# Patient Record
Sex: FEMALE
[De-identification: names, ages, dates, MRNs, and addresses within clinical notes are randomized per-mention and may not be internally consistent; named-entity substitution may affect disease eponyms.]

---

## 2017-10-10 ENCOUNTER — RESULT REVIEW (OUTPATIENT)
Age: 32
End: 2017-10-10

## 2018-03-16 ENCOUNTER — TRANSCRIPTION ENCOUNTER (OUTPATIENT)
Age: 33
End: 2018-03-16

## 2018-03-27 ENCOUNTER — APPOINTMENT (OUTPATIENT)
Dept: OTOLARYNGOLOGY | Facility: CLINIC | Age: 33
End: 2018-03-27

## 2018-08-01 ENCOUNTER — TRANSCRIPTION ENCOUNTER (OUTPATIENT)
Age: 33
End: 2018-08-01

## 2019-05-30 ENCOUNTER — RESULT REVIEW (OUTPATIENT)
Age: 34
End: 2019-05-30

## 2019-06-19 ENCOUNTER — RECORD ABSTRACTING (OUTPATIENT)
Age: 34
End: 2019-06-19

## 2019-06-19 ENCOUNTER — APPOINTMENT (OUTPATIENT)
Dept: OBGYN | Facility: CLINIC | Age: 34
End: 2019-06-19

## 2019-06-19 DIAGNOSIS — Z92.89 PERSONAL HISTORY OF OTHER MEDICAL TREATMENT: ICD-10-CM

## 2019-06-19 DIAGNOSIS — Z86.59 PERSONAL HISTORY OF OTHER MENTAL AND BEHAVIORAL DISORDERS: ICD-10-CM

## 2019-06-19 DIAGNOSIS — F43.10 POST-TRAUMATIC STRESS DISORDER, UNSPECIFIED: ICD-10-CM

## 2019-06-19 DIAGNOSIS — Z78.9 OTHER SPECIFIED HEALTH STATUS: ICD-10-CM

## 2019-06-19 DIAGNOSIS — B00.9 HERPESVIRAL INFECTION, UNSPECIFIED: ICD-10-CM

## 2019-06-19 DIAGNOSIS — Z82.49 FAMILY HISTORY OF ISCHEMIC HEART DISEASE AND OTHER DISEASES OF THE CIRCULATORY SYSTEM: ICD-10-CM

## 2019-06-19 DIAGNOSIS — Z86.19 PERSONAL HISTORY OF OTHER INFECTIOUS AND PARASITIC DISEASES: ICD-10-CM

## 2019-06-19 DIAGNOSIS — D64.9 ANEMIA, UNSPECIFIED: ICD-10-CM

## 2019-06-19 DIAGNOSIS — F41.9 ANXIETY DISORDER, UNSPECIFIED: ICD-10-CM

## 2019-06-19 DIAGNOSIS — G47.9 SLEEP DISORDER, UNSPECIFIED: ICD-10-CM

## 2019-06-19 DIAGNOSIS — Z80.7 FAMILY HISTORY OF OTHER MALIGNANT NEOPLASMS OF LYMPHOID, HEMATOPOIETIC AND RELATED TISSUES: ICD-10-CM

## 2019-06-19 DIAGNOSIS — J45.909 UNSPECIFIED ASTHMA, UNCOMPLICATED: ICD-10-CM

## 2019-06-19 DIAGNOSIS — Z86.39 PERSONAL HISTORY OF OTHER ENDOCRINE, NUTRITIONAL AND METABOLIC DISEASE: ICD-10-CM

## 2019-06-19 LAB — CYTOLOGY CVX/VAG DOC THIN PREP: NORMAL

## 2019-06-19 RX ORDER — DEXLANSOPRAZOLE 60 MG/1
60 CAPSULE, DELAYED RELEASE ORAL
Refills: 0 | Status: ACTIVE | COMMUNITY

## 2019-06-19 RX ORDER — BUDESONIDE AND FORMOTEROL FUMARATE DIHYDRATE 80; 4.5 UG/1; UG/1
80-4.5 AEROSOL RESPIRATORY (INHALATION)
Refills: 0 | Status: ACTIVE | COMMUNITY

## 2020-02-20 ENCOUNTER — TRANSCRIPTION ENCOUNTER (OUTPATIENT)
Age: 35
End: 2020-02-20

## 2020-02-28 ENCOUNTER — TRANSCRIPTION ENCOUNTER (OUTPATIENT)
Age: 35
End: 2020-02-28

## 2020-07-22 ENCOUNTER — TRANSCRIPTION ENCOUNTER (OUTPATIENT)
Age: 35
End: 2020-07-22

## 2021-05-28 ENCOUNTER — TRANSCRIPTION ENCOUNTER (OUTPATIENT)
Age: 36
End: 2021-05-28

## 2021-09-16 ENCOUNTER — APPOINTMENT (OUTPATIENT)
Dept: OBGYN | Facility: CLINIC | Age: 36
End: 2021-09-16

## 2021-09-25 ENCOUNTER — TRANSCRIPTION ENCOUNTER (OUTPATIENT)
Age: 36
End: 2021-09-25

## 2022-03-03 ENCOUNTER — TRANSCRIPTION ENCOUNTER (OUTPATIENT)
Age: 37
End: 2022-03-03

## 2022-03-15 ENCOUNTER — TRANSCRIPTION ENCOUNTER (OUTPATIENT)
Age: 37
End: 2022-03-15

## 2022-03-18 ENCOUNTER — TRANSCRIPTION ENCOUNTER (OUTPATIENT)
Age: 37
End: 2022-03-18

## 2022-09-25 ENCOUNTER — NON-APPOINTMENT (OUTPATIENT)
Age: 37
End: 2022-09-25

## 2022-10-24 ENCOUNTER — NON-APPOINTMENT (OUTPATIENT)
Age: 37
End: 2022-10-24

## 2023-04-03 ENCOUNTER — NON-APPOINTMENT (OUTPATIENT)
Age: 38
End: 2023-04-03

## 2023-04-17 ENCOUNTER — NON-APPOINTMENT (OUTPATIENT)
Age: 38
End: 2023-04-17

## 2023-05-01 PROBLEM — Z00.00 ENCOUNTER FOR PREVENTIVE HEALTH EXAMINATION: Status: ACTIVE | Noted: 2023-05-01

## 2023-05-03 ENCOUNTER — APPOINTMENT (OUTPATIENT)
Dept: FAMILY MEDICINE | Facility: CLINIC | Age: 38
End: 2023-05-03

## 2023-07-27 ENCOUNTER — APPOINTMENT (OUTPATIENT)
Dept: OBGYN | Facility: CLINIC | Age: 38
End: 2023-07-27

## 2023-08-02 ENCOUNTER — NON-APPOINTMENT (OUTPATIENT)
Age: 38
End: 2023-08-02

## 2023-08-02 ENCOUNTER — APPOINTMENT (OUTPATIENT)
Dept: OBGYN | Facility: CLINIC | Age: 38
End: 2023-08-02
Payer: COMMERCIAL

## 2023-08-02 VITALS — SYSTOLIC BLOOD PRESSURE: 122 MMHG | DIASTOLIC BLOOD PRESSURE: 78 MMHG | WEIGHT: 264.4 LBS

## 2023-08-02 DIAGNOSIS — Z01.411 ENCOUNTER FOR GYNECOLOGICAL EXAMINATION (GENERAL) (ROUTINE) WITH ABNORMAL FINDINGS: ICD-10-CM

## 2023-08-02 DIAGNOSIS — Z12.4 ENCOUNTER FOR SCREENING FOR MALIGNANT NEOPLASM OF CERVIX: ICD-10-CM

## 2023-08-02 DIAGNOSIS — Z80.0 FAMILY HISTORY OF MALIGNANT NEOPLASM OF DIGESTIVE ORGANS: ICD-10-CM

## 2023-08-02 DIAGNOSIS — Z13.220 ENCOUNTER FOR SCREENING FOR LIPOID DISORDERS: ICD-10-CM

## 2023-08-02 DIAGNOSIS — Z11.3 ENCOUNTER FOR SCREENING FOR INFECTIONS WITH A PREDOMINANTLY SEXUAL MODE OF TRANSMISSION: ICD-10-CM

## 2023-08-02 DIAGNOSIS — Z12.39 ENCOUNTER FOR OTHER SCREENING FOR MALIGNANT NEOPLASM OF BREAST: ICD-10-CM

## 2023-08-02 DIAGNOSIS — Z13.1 ENCOUNTER FOR SCREENING FOR DIABETES MELLITUS: ICD-10-CM

## 2023-08-02 DIAGNOSIS — Z87.42 PERSONAL HISTORY OF OTHER DISEASES OF THE FEMALE GENITAL TRACT: ICD-10-CM

## 2023-08-02 DIAGNOSIS — Z82.49 FAMILY HISTORY OF ISCHEMIC HEART DISEASE AND OTHER DISEASES OF THE CIRCULATORY SYSTEM: ICD-10-CM

## 2023-08-02 DIAGNOSIS — L65.9 NONSCARRING HAIR LOSS, UNSPECIFIED: ICD-10-CM

## 2023-08-02 DIAGNOSIS — Z13.21 ENCOUNTER FOR SCREENING FOR NUTRITIONAL DISORDER: ICD-10-CM

## 2023-08-02 DIAGNOSIS — Z83.49 FAMILY HISTORY OF OTHER ENDOCRINE, NUTRITIONAL AND METABOLIC DISEASES: ICD-10-CM

## 2023-08-02 DIAGNOSIS — Z78.9 OTHER SPECIFIED HEALTH STATUS: ICD-10-CM

## 2023-08-02 PROCEDURE — 99385 PREV VISIT NEW AGE 18-39: CPT

## 2023-08-02 PROCEDURE — 99203 OFFICE O/P NEW LOW 30 MIN: CPT

## 2023-08-02 PROCEDURE — 36415 COLL VENOUS BLD VENIPUNCTURE: CPT

## 2023-08-02 RX ORDER — FEXOFENADINE HCL 60 MG
CAPSULE ORAL
Refills: 0 | Status: ACTIVE | COMMUNITY

## 2023-08-02 RX ORDER — BUPROPION HYDROCHLORIDE 75 MG/1
TABLET, FILM COATED ORAL
Refills: 0 | Status: ACTIVE | COMMUNITY

## 2023-08-02 RX ORDER — FLUTICASONE PROPIONATE 50 MCG
SPRAY, SUSPENSION NASAL
Refills: 0 | Status: ACTIVE | COMMUNITY

## 2023-08-02 RX ORDER — DEXTROAMPHETAMINE SULFATE, DEXTROAMPHETAMINE SACCHARATE, AMPHETAMINE SULFATE AND AMPHETAMINE ASPARTATE 7.5; 7.5; 7.5; 7.5 MG/1; MG/1; MG/1; MG/1
30 CAPSULE, EXTENDED RELEASE ORAL
Refills: 0 | Status: ACTIVE | COMMUNITY

## 2023-08-02 RX ORDER — CETIRIZINE HCL 10 MG
TABLET ORAL
Refills: 0 | Status: ACTIVE | COMMUNITY

## 2023-08-02 RX ORDER — TOPIRAMATE 50 MG/1
TABLET, COATED ORAL
Refills: 0 | Status: ACTIVE | COMMUNITY

## 2023-08-04 ENCOUNTER — NON-APPOINTMENT (OUTPATIENT)
Age: 38
End: 2023-08-04

## 2023-08-04 LAB
25(OH)D3 SERPL-MCNC: 27.8 NG/ML
C TRACH RRNA SPEC QL NAA+PROBE: NOT DETECTED
CHOLEST SERPL-MCNC: 160 MG/DL
DHEA-S SERPL-MCNC: 46.2 UG/DL
ESTIMATED AVERAGE GLUCOSE: 103 MG/DL
ESTRADIOL SERPL-MCNC: 423 PG/ML
FSH SERPL-MCNC: 11.6 IU/L
GLUCOSE BS SERPL-MCNC: 81 MG/DL
HBA1C MFR BLD HPLC: 5.2 %
HDLC SERPL-MCNC: 60 MG/DL
HPV HIGH+LOW RISK DNA PNL CVX: NOT DETECTED
INSULIN P FAST SERPL-ACNC: 9.1 UU/ML
LDLC SERPL CALC-MCNC: 82 MG/DL
LH SERPL-ACNC: 38.2 IU/L
N GONORRHOEA RRNA SPEC QL NAA+PROBE: NOT DETECTED
NONHDLC SERPL-MCNC: 101 MG/DL
PROLACTIN SERPL-MCNC: 21.1 NG/ML
SOURCE TP AMPLIFICATION: NORMAL
TESTOST FREE SERPL-MCNC: 1.2 PG/ML
TESTOST SERPL-MCNC: 10.8 NG/DL
TRIGL SERPL-MCNC: 100 MG/DL
TSH SERPL-ACNC: 1.66 UIU/ML

## 2023-08-05 LAB — CYTOLOGY CVX/VAG DOC THIN PREP: NORMAL

## 2023-08-07 LAB — 17OHP SERPL-MCNC: 94 NG/DL

## 2024-05-15 ENCOUNTER — NON-APPOINTMENT (OUTPATIENT)
Age: 39
End: 2024-05-15

## 2024-05-15 ENCOUNTER — APPOINTMENT (OUTPATIENT)
Dept: FAMILY MEDICINE | Facility: CLINIC | Age: 39
End: 2024-05-15
Payer: COMMERCIAL

## 2024-05-15 VITALS
WEIGHT: 237 LBS | HEIGHT: 65.5 IN | SYSTOLIC BLOOD PRESSURE: 130 MMHG | BODY MASS INDEX: 39.01 KG/M2 | HEART RATE: 95 BPM | DIASTOLIC BLOOD PRESSURE: 90 MMHG | OXYGEN SATURATION: 97 %

## 2024-05-15 DIAGNOSIS — M72.2 PLANTAR FASCIAL FIBROMATOSIS: ICD-10-CM

## 2024-05-15 DIAGNOSIS — E83.52 HYPERCALCEMIA: ICD-10-CM

## 2024-05-15 DIAGNOSIS — K21.9 GASTRO-ESOPHAGEAL REFLUX DISEASE W/OUT ESOPHAGITIS: ICD-10-CM

## 2024-05-15 DIAGNOSIS — G43.109 MIGRAINE WITH AURA, NOT INTRACTABLE, W/OUT STATUS MIGRAINOSUS: ICD-10-CM

## 2024-05-15 DIAGNOSIS — F32.A DEPRESSION, UNSPECIFIED: ICD-10-CM

## 2024-05-15 DIAGNOSIS — Z00.00 ENCOUNTER FOR GENERAL ADULT MEDICAL EXAMINATION W/OUT ABNORMAL FINDINGS: ICD-10-CM

## 2024-05-15 DIAGNOSIS — R06.83 SNORING: ICD-10-CM

## 2024-05-15 DIAGNOSIS — F90.8 ATTENTION-DEFICIT HYPERACTIVITY DISORDER, OTHER TYPE: ICD-10-CM

## 2024-05-15 DIAGNOSIS — R92.30 DENSE BREASTS, UNSPECIFIED: ICD-10-CM

## 2024-05-15 DIAGNOSIS — Z11.59 ENCOUNTER FOR SCREENING FOR OTHER VIRAL DISEASES: ICD-10-CM

## 2024-05-15 DIAGNOSIS — F41.9 ANXIETY DISORDER, UNSPECIFIED: ICD-10-CM

## 2024-05-15 DIAGNOSIS — Z11.4 ENCOUNTER FOR SCREENING FOR HUMAN IMMUNODEFICIENCY VIRUS [HIV]: ICD-10-CM

## 2024-05-15 DIAGNOSIS — R53.83 OTHER FATIGUE: ICD-10-CM

## 2024-05-15 PROCEDURE — G0444 DEPRESSION SCREEN ANNUAL: CPT | Mod: 59

## 2024-05-15 PROCEDURE — 93000 ELECTROCARDIOGRAM COMPLETE: CPT | Mod: 59

## 2024-05-15 PROCEDURE — 99386 PREV VISIT NEW AGE 40-64: CPT

## 2024-05-15 RX ORDER — PANTOPRAZOLE 40 MG/1
40 TABLET, DELAYED RELEASE ORAL
Qty: 90 | Refills: 1 | Status: ACTIVE | COMMUNITY
Start: 2024-05-15 | End: 1900-01-01

## 2024-05-15 RX ORDER — BUPROPION HYDROCHLORIDE 100 MG/1
100 TABLET, FILM COATED, EXTENDED RELEASE ORAL DAILY
Qty: 90 | Refills: 3 | Status: ACTIVE | COMMUNITY
Start: 2024-05-15 | End: 1900-01-01

## 2024-05-15 NOTE — ADDENDUM
[FreeTextEntry1] : This note was written by Rochelle Felix, acting as the  for Dr. Madera. This note accurately reflects the work and decisions made by Dr. Madera.

## 2024-05-15 NOTE — HEALTH RISK ASSESSMENT
[Good] : ~his/her~  mood as  good [Yes] : Yes [Monthly or less (1 pt)] : Monthly or less (1 point) [1 or 2 (0 pts)] : 1 or 2 (0 points) [Never (0 pts)] : Never (0 points) [No] : In the past 12 months have you used drugs other than those required for medical reasons? No [No falls in past year] : Patient reported no falls in the past year [0] : 2) Feeling down, depressed, or hopeless: Not at all (0) [PHQ-2 Negative - No further assessment needed] : PHQ-2 Negative - No further assessment needed [I have developed a follow-up plan documented below in the note.] : I have developed a follow-up plan documented below in the note. [None] : None [With Significant Other] : lives with significant other [Employed] : employed [] :  [Feels Safe at Home] : Feels safe at home [Fully functional (bathing, dressing, toileting, transferring, walking, feeding)] : Fully functional (bathing, dressing, toileting, transferring, walking, feeding) [Fully functional (using the telephone, shopping, preparing meals, housekeeping, doing laundry, using] : Fully functional and needs no help or supervision to perform IADLs (using the telephone, shopping, preparing meals, housekeeping, doing laundry, using transportation, managing medications and managing finances) [Smoke Detector] : smoke detector [Never] : Never [Audit-CScore] : 1 [de-identified] : Has not been exercising regularly.  [de-identified] : Eats a normal, well rounded diet.  [IPY6Rrvuk] : 0 [Change in mental status noted] : No change in mental status noted [Reports changes in hearing] : Reports no changes in hearing [Reports changes in vision] : Reports no changes in vision [Reports normal functional visual acuity (ie: able to read med bottle)] : Reports poor functional visual acuity.  [Reports changes in dental health] : Reports no changes in dental health [PapSmearDate] : 08/23

## 2024-05-15 NOTE — HISTORY OF PRESENT ILLNESS
[FreeTextEntry1] : CPE [de-identified] : A 38 year old female presents today for a CPE. Mood is good. Pt currently works in a school as a . She is currently taking Adderall XR, Bupropion 100mg, and Zepbound 5.0mg. She has followed up with a PCP in the past, has stopped due to distance. Pt follows up with the gynecologist, Dr. Galvez, is due for an annual exam in August. She claims she had been dealing with weight for most of her life, the Zepbound had helped but she has been gaining weight due to loss of medication. Pt had a parathyroidectomy in 2018, states her calcium was severely elevated. She had been admitted to the hospital, found her calcium was at 19. She had followed up with an endocrinologist, Dr. Johnson post-discharge for evaluation, has been recommended to go for an US; pt is due for a follow-up. She had followed up with a pulmonologist with c/o reoccurring bronchitis, had stopped drinking milk which seemed to help. Denies any recent events of bronchitis. She c/o frequent acid reflux, would like to try medicinal intervention. Denies any recent endoscopies. Pt has been going regularly for colonoscopy screenings, states her father had passed at 40 due to colon cancer. Her mother had also passed in her 60s due to heart complications, is unsure of exactly what. She c/o recent dizziness, has followed up with a neurologist who found no abnormalities.

## 2024-05-15 NOTE — PLAN
[FreeTextEntry1] : -General bloodwork done. -Will start the patient on Pantoprazole for treatment of GERD. -Will give the patient a script for a mammogram to search for signs of dense breast / breast cancer along with a sonogram to screen for breast nodules.  -Recommended she follow-up with a podiatrist for treatment of plantar fasciitis.  -Recommended she find her mothers diagnoses. -Will order a sleep study to examine causes of snoring.  -Will follow up with the patient in 1 year or as needed.

## 2024-05-17 ENCOUNTER — NON-APPOINTMENT (OUTPATIENT)
Age: 39
End: 2024-05-17

## 2024-05-17 LAB
25(OH)D3 SERPL-MCNC: 44.6 NG/ML
ALBUMIN SERPL ELPH-MCNC: 4.4 G/DL
ALP BLD-CCNC: 74 U/L
ALT SERPL-CCNC: 14 U/L
ANION GAP SERPL CALC-SCNC: 12 MMOL/L
APPEARANCE: CLEAR
AST SERPL-CCNC: 16 U/L
BACTERIA: NEGATIVE /HPF
BASOPHILS # BLD AUTO: 0.02 K/UL
BASOPHILS NFR BLD AUTO: 0.4 %
BILIRUB SERPL-MCNC: 0.4 MG/DL
BILIRUBIN URINE: NEGATIVE
BLOOD URINE: NEGATIVE
BUN SERPL-MCNC: 13 MG/DL
CALCIUM SERPL-MCNC: 9.1 MG/DL
CALCIUM SERPL-MCNC: 9.1 MG/DL
CAST: 0 /LPF
CHLORIDE SERPL-SCNC: 106 MMOL/L
CHOLEST SERPL-MCNC: 173 MG/DL
CO2 SERPL-SCNC: 22 MMOL/L
COLOR: YELLOW
CREAT SERPL-MCNC: 0.65 MG/DL
EGFR: 114 ML/MIN/1.73M2
EOSINOPHIL # BLD AUTO: 0.05 K/UL
EOSINOPHIL NFR BLD AUTO: 0.9 %
EPITHELIAL CELLS: 1 /HPF
ESTIMATED AVERAGE GLUCOSE: 97 MG/DL
FERRITIN SERPL-MCNC: 23 NG/ML
FOLATE SERPL-MCNC: 4.9 NG/ML
GLUCOSE QUALITATIVE U: NEGATIVE MG/DL
GLUCOSE SERPL-MCNC: 88 MG/DL
HBA1C MFR BLD HPLC: 5 %
HCT VFR BLD CALC: 40.8 %
HCV AB SER QL: NONREACTIVE
HCV S/CO RATIO: 0.51 S/CO
HDLC SERPL-MCNC: 55 MG/DL
HGB BLD-MCNC: 13.2 G/DL
HIV1+2 AB SPEC QL IA.RAPID: NONREACTIVE
IMM GRANULOCYTES NFR BLD AUTO: 0.2 %
IRON SATN MFR SERPL: 26 %
IRON SERPL-MCNC: 78 UG/DL
KETONES URINE: NEGATIVE MG/DL
LDLC SERPL CALC-MCNC: 106 MG/DL
LEUKOCYTE ESTERASE URINE: NEGATIVE
LYMPHOCYTES # BLD AUTO: 1.75 K/UL
LYMPHOCYTES NFR BLD AUTO: 31.9 %
MAN DIFF?: NORMAL
MCHC RBC-ENTMCNC: 29.3 PG
MCHC RBC-ENTMCNC: 32.4 GM/DL
MCV RBC AUTO: 90.5 FL
MICROSCOPIC-UA: NORMAL
MONOCYTES # BLD AUTO: 0.32 K/UL
MONOCYTES NFR BLD AUTO: 5.8 %
NEUTROPHILS # BLD AUTO: 3.33 K/UL
NEUTROPHILS NFR BLD AUTO: 60.8 %
NITRITE URINE: NEGATIVE
NONHDLC SERPL-MCNC: 117 MG/DL
PARATHYROID HORMONE INTACT: 27 PG/ML
PH URINE: 7.5
PLATELET # BLD AUTO: 265 K/UL
POTASSIUM SERPL-SCNC: 4.8 MMOL/L
PROT SERPL-MCNC: 6.9 G/DL
PROTEIN URINE: NEGATIVE MG/DL
RBC # BLD: 4.51 M/UL
RBC # FLD: 14 %
RED BLOOD CELLS URINE: 0 /HPF
SODIUM SERPL-SCNC: 139 MMOL/L
SPECIFIC GRAVITY URINE: 1
T4 FREE SERPL-MCNC: 1.2 NG/DL
THYROGLOB AB SERPL-ACNC: <20 IU/ML
THYROPEROXIDASE AB SERPL IA-ACNC: <10 IU/ML
TIBC SERPL-MCNC: 295 UG/DL
TRIGL SERPL-MCNC: 59 MG/DL
TSH SERPL-ACNC: 1.63 UIU/ML
UIBC SERPL-MCNC: 217 UG/DL
URATE SERPL-MCNC: 3.2 MG/DL
UROBILINOGEN URINE: 0.2 MG/DL
VIT B12 SERPL-MCNC: 615 PG/ML
WBC # FLD AUTO: 5.48 K/UL
WHITE BLOOD CELLS URINE: 0 /HPF

## 2024-08-30 ENCOUNTER — EMERGENCY (EMERGENCY)
Facility: HOSPITAL | Age: 39
LOS: 1 days | Discharge: HOME | End: 2024-08-30
Attending: EMERGENCY MEDICINE
Payer: COMMERCIAL

## 2024-08-30 VITALS
DIASTOLIC BLOOD PRESSURE: 114 MMHG | HEIGHT: 66 IN | SYSTOLIC BLOOD PRESSURE: 140 MMHG | TEMPERATURE: 98 F | RESPIRATION RATE: 16 BRPM | HEART RATE: 81 BPM | WEIGHT: 160.06 LBS | OXYGEN SATURATION: 96 %

## 2024-08-30 DIAGNOSIS — Z98.890 OTHER SPECIFIED POSTPROCEDURAL STATES: Chronic | ICD-10-CM

## 2024-08-30 LAB
ALBUMIN SERPL ELPH-MCNC: 4 G/DL — SIGNIFICANT CHANGE UP (ref 3.3–5)
ALP SERPL-CCNC: 72 U/L — SIGNIFICANT CHANGE UP (ref 40–120)
ALT FLD-CCNC: 13 U/L — SIGNIFICANT CHANGE UP (ref 10–45)
ANION GAP SERPL CALC-SCNC: 12 MMOL/L — SIGNIFICANT CHANGE UP (ref 5–17)
APPEARANCE UR: CLEAR — SIGNIFICANT CHANGE UP
APTT BLD: 28.8 SEC — SIGNIFICANT CHANGE UP (ref 24.5–35.6)
AST SERPL-CCNC: 15 U/L — SIGNIFICANT CHANGE UP (ref 10–40)
BACTERIA # UR AUTO: NEGATIVE /HPF — SIGNIFICANT CHANGE UP
BASOPHILS # BLD AUTO: 0.01 K/UL — SIGNIFICANT CHANGE UP (ref 0–0.2)
BASOPHILS NFR BLD AUTO: 0.2 % — SIGNIFICANT CHANGE UP (ref 0–2)
BILIRUB SERPL-MCNC: 0.3 MG/DL — SIGNIFICANT CHANGE UP (ref 0.2–1.2)
BILIRUB UR-MCNC: NEGATIVE — SIGNIFICANT CHANGE UP
BUN SERPL-MCNC: 8 MG/DL — SIGNIFICANT CHANGE UP (ref 7–23)
CALCIUM SERPL-MCNC: 9 MG/DL — SIGNIFICANT CHANGE UP (ref 8.4–10.5)
CAST: 0 /LPF — SIGNIFICANT CHANGE UP (ref 0–4)
CHLORIDE SERPL-SCNC: 106 MMOL/L — SIGNIFICANT CHANGE UP (ref 96–108)
CO2 SERPL-SCNC: 20 MMOL/L — LOW (ref 22–31)
COLOR SPEC: YELLOW — SIGNIFICANT CHANGE UP
CREAT SERPL-MCNC: 0.7 MG/DL — SIGNIFICANT CHANGE UP (ref 0.5–1.3)
DIFF PNL FLD: NEGATIVE — SIGNIFICANT CHANGE UP
EGFR: 112 ML/MIN/1.73M2 — SIGNIFICANT CHANGE UP
EOSINOPHIL # BLD AUTO: 0.29 K/UL — SIGNIFICANT CHANGE UP (ref 0–0.5)
EOSINOPHIL NFR BLD AUTO: 4.6 % — SIGNIFICANT CHANGE UP (ref 0–6)
GLUCOSE SERPL-MCNC: 93 MG/DL — SIGNIFICANT CHANGE UP (ref 70–99)
GLUCOSE UR QL: NEGATIVE MG/DL — SIGNIFICANT CHANGE UP
HCT VFR BLD CALC: 38.2 % — SIGNIFICANT CHANGE UP (ref 34.5–45)
HGB BLD-MCNC: 13.1 G/DL — SIGNIFICANT CHANGE UP (ref 11.5–15.5)
IMM GRANULOCYTES NFR BLD AUTO: 0.3 % — SIGNIFICANT CHANGE UP (ref 0–0.9)
INR BLD: 1.19 RATIO — HIGH (ref 0.85–1.18)
KETONES UR-MCNC: NEGATIVE MG/DL — SIGNIFICANT CHANGE UP
LEUKOCYTE ESTERASE UR-ACNC: ABNORMAL
LIDOCAIN IGE QN: 23 U/L — SIGNIFICANT CHANGE UP (ref 7–60)
LYMPHOCYTES # BLD AUTO: 1.21 K/UL — SIGNIFICANT CHANGE UP (ref 1–3.3)
LYMPHOCYTES # BLD AUTO: 19.1 % — SIGNIFICANT CHANGE UP (ref 13–44)
MCHC RBC-ENTMCNC: 29.2 PG — SIGNIFICANT CHANGE UP (ref 27–34)
MCHC RBC-ENTMCNC: 34.3 GM/DL — SIGNIFICANT CHANGE UP (ref 32–36)
MCV RBC AUTO: 85.1 FL — SIGNIFICANT CHANGE UP (ref 80–100)
MONOCYTES # BLD AUTO: 0.48 K/UL — SIGNIFICANT CHANGE UP (ref 0–0.9)
MONOCYTES NFR BLD AUTO: 7.6 % — SIGNIFICANT CHANGE UP (ref 2–14)
NEUTROPHILS # BLD AUTO: 4.33 K/UL — SIGNIFICANT CHANGE UP (ref 1.8–7.4)
NEUTROPHILS NFR BLD AUTO: 68.2 % — SIGNIFICANT CHANGE UP (ref 43–77)
NITRITE UR-MCNC: NEGATIVE — SIGNIFICANT CHANGE UP
NRBC # BLD: 0 /100 WBCS — SIGNIFICANT CHANGE UP (ref 0–0)
PH UR: 6 — SIGNIFICANT CHANGE UP (ref 5–8)
PLATELET # BLD AUTO: 220 K/UL — SIGNIFICANT CHANGE UP (ref 150–400)
POTASSIUM SERPL-MCNC: 3.5 MMOL/L — SIGNIFICANT CHANGE UP (ref 3.5–5.3)
POTASSIUM SERPL-SCNC: 3.5 MMOL/L — SIGNIFICANT CHANGE UP (ref 3.5–5.3)
PROT SERPL-MCNC: 6.9 G/DL — SIGNIFICANT CHANGE UP (ref 6–8.3)
PROT UR-MCNC: NEGATIVE MG/DL — SIGNIFICANT CHANGE UP
PROTHROM AB SERPL-ACNC: 12.4 SEC — SIGNIFICANT CHANGE UP (ref 9.5–13)
RBC # BLD: 4.49 M/UL — SIGNIFICANT CHANGE UP (ref 3.8–5.2)
RBC # FLD: 13.2 % — SIGNIFICANT CHANGE UP (ref 10.3–14.5)
RBC CASTS # UR COMP ASSIST: 0 /HPF — SIGNIFICANT CHANGE UP (ref 0–4)
REVIEW: SIGNIFICANT CHANGE UP
SODIUM SERPL-SCNC: 138 MMOL/L — SIGNIFICANT CHANGE UP (ref 135–145)
SP GR SPEC: <1.005 — LOW (ref 1–1.03)
SQUAMOUS # UR AUTO: 0 /HPF — SIGNIFICANT CHANGE UP (ref 0–5)
UROBILINOGEN FLD QL: 0.2 MG/DL — SIGNIFICANT CHANGE UP (ref 0.2–1)
WBC # BLD: 6.34 K/UL — SIGNIFICANT CHANGE UP (ref 3.8–10.5)
WBC # FLD AUTO: 6.34 K/UL — SIGNIFICANT CHANGE UP (ref 3.8–10.5)
WBC UR QL: 1 /HPF — SIGNIFICANT CHANGE UP (ref 0–5)

## 2024-08-30 PROCEDURE — 85025 COMPLETE CBC W/AUTO DIFF WBC: CPT

## 2024-08-30 PROCEDURE — 85610 PROTHROMBIN TIME: CPT

## 2024-08-30 PROCEDURE — 73610 X-RAY EXAM OF ANKLE: CPT | Mod: 26,LT

## 2024-08-30 PROCEDURE — 73590 X-RAY EXAM OF LOWER LEG: CPT | Mod: 26,LT

## 2024-08-30 PROCEDURE — 83970 ASSAY OF PARATHORMONE: CPT

## 2024-08-30 PROCEDURE — 99285 EMERGENCY DEPT VISIT HI MDM: CPT | Mod: 25

## 2024-08-30 PROCEDURE — 80053 COMPREHEN METABOLIC PANEL: CPT

## 2024-08-30 PROCEDURE — 73590 X-RAY EXAM OF LOWER LEG: CPT

## 2024-08-30 PROCEDURE — 82310 ASSAY OF CALCIUM: CPT

## 2024-08-30 PROCEDURE — 73630 X-RAY EXAM OF FOOT: CPT | Mod: 26,LT

## 2024-08-30 PROCEDURE — 81001 URINALYSIS AUTO W/SCOPE: CPT

## 2024-08-30 PROCEDURE — 73630 X-RAY EXAM OF FOOT: CPT

## 2024-08-30 PROCEDURE — 73610 X-RAY EXAM OF ANKLE: CPT

## 2024-08-30 PROCEDURE — 85730 THROMBOPLASTIN TIME PARTIAL: CPT

## 2024-08-30 PROCEDURE — 84484 ASSAY OF TROPONIN QUANT: CPT

## 2024-08-30 PROCEDURE — 93005 ELECTROCARDIOGRAM TRACING: CPT

## 2024-08-30 PROCEDURE — 99285 EMERGENCY DEPT VISIT HI MDM: CPT

## 2024-08-30 PROCEDURE — 83690 ASSAY OF LIPASE: CPT

## 2024-08-30 RX ORDER — OXYCODONE HYDROCHLORIDE 5 MG/1
5 TABLET ORAL ONCE
Refills: 0 | Status: DISCONTINUED | OUTPATIENT
Start: 2024-08-30 | End: 2024-08-30

## 2024-08-30 RX ORDER — ACETAMINOPHEN 325 MG/1
975 TABLET ORAL ONCE
Refills: 0 | Status: COMPLETED | OUTPATIENT
Start: 2024-08-30 | End: 2024-08-30

## 2024-08-30 RX ADMIN — OXYCODONE HYDROCHLORIDE 5 MILLIGRAM(S): 5 TABLET ORAL at 20:00

## 2024-08-30 RX ADMIN — ACETAMINOPHEN 975 MILLIGRAM(S): 325 TABLET ORAL at 20:00

## 2024-08-30 NOTE — ED PROVIDER NOTE - ATTENDING CONTRIBUTION TO CARE
I, Jean Rios MD, Emergency Medicine Attending Physician, personally saw and examined the patient and I personally made/approve the management plan and take responsibility for the patient management.    MDM: 38-year-old female with history of parathyroid mass s/p resection, ocular migraines, who was brought in by EMS for left ankle pain and swelling after a mechanical trip and fall on uneven concrete.  Patient believes she inverted her ankle.  Patient has been unable to ambulate due to pain in the left ankle.  Patient states she has pain to the medial and lateral aspects.  Separately, patient has been having epigastric pain for the last several days, intermittent, and started Zepbound few weeks ago, and patient is concerned about pancreatitis.  No exertional or pleuritic symptoms, no radiation of pain.  Patient is asymptomatic at this time.  Denies being on any antiplatelets or anticoagulants, no personal or family history of bleeding disorder.    Meds: Zepbound, Topamax, Wellbutrin, Adderall    ROS: denies LOC, syncope, head injury, neck pain, chest pain, shortness of breath, back pain, numbness, weakness, vomiting, lightheadedness, vision changes.    Vitals stable.  Mildly uncomfortable.,  NCAT, GCS 15, PERRL, EOMI without diplopia or discomfort, trachea midline, no stridor, CTAB, NRRR.  No chest wall tenderness, deformity or crepitus.  ABD: Abdomen soft, with mild tenderness epigastrium, otherwise non-tender and non-distended, no rebound, no guarding, no rigidity.  Negative Cole sign.  No CVA tenderness..  No signs of external trauma to chest and abdomen, no ecchymosis.  No tenderness or deformity to head, maxillo-facial, or midline of cervical/thoracic/lumbar vertebrae.  (+) Tenderness to the medial and lateral malleoli, mild swelling in this region.  Mildly reduced range of motion in all planes.  Otherwise no tenderness, deformity or reduced ROM to all other joints of all four extremities.  Pelvis stable.  Neurovascularly intact in all 4 extremities with 5/5 strength, normal sensation, equal pulses and brisk capillary refill, soft compartments.  Cranial nerves III-XII intact, no pronator drift, normal speech and gait.  No skin laceration.    Initial plan to workup for left lower extremity traumatic injury, plan for x-ray.  However after initial evaluation, patient's  mention patient with epigastric pain.  When patient was questioned about this, patient states she had nausea and epigastric pain which has been intermittent, and is concerned about side effect from Zepbound including pancreatitis.  Patient states this is unrelated to her fall.  Patient had no prodrome or pain prior.  No syncope.  Thus, will obtain labs to evaluate for hematologic disorder, metabolic derangements, hepatic and renal function, and screen for pancreatitis.  Will obtain EKG and troponin to evaluate for possible cardiac abnormality including ACS and arrhythmia.  Patient also requesting parathyroid and calcium lab testing.    My independent interpretation of the left ankle x-ray images shows no acute fracture or dislocation.   More details to be seen in the official radiologist read. I, Jean Rios MD, Emergency Medicine Attending Physician, personally saw and examined the patient and I personally made/approve the management plan and take responsibility for the patient management.    MDM: 38-year-old female with history of parathyroid mass s/p resection, ocular migraines, who was brought in by EMS for left ankle pain and swelling after a mechanical trip and fall on uneven concrete.  Patient believes she inverted her ankle.  Patient has been unable to ambulate due to pain in the left ankle.  Patient states she has pain to the medial and lateral aspects.  Separately, patient has been having epigastric pain for the last several days, intermittent, and started Zepbound few weeks ago, and patient is concerned about pancreatitis.  No exertional or pleuritic symptoms, no radiation of pain.  Patient is asymptomatic at this time.  Denies being on any antiplatelets or anticoagulants, no personal or family history of bleeding disorder.    Meds: Zepbound, Topamax, Wellbutrin, Adderall    ROS: denies LOC, syncope, head injury, neck pain, chest pain, shortness of breath, back pain, numbness, weakness, vomiting, lightheadedness, vision changes.    Vitals stable.  Mildly uncomfortable.,  NCAT, GCS 15, PERRL, EOMI without diplopia or discomfort, trachea midline, no stridor, CTAB, NRRR.  No chest wall tenderness, deformity or crepitus.  ABD: Abdomen soft, with mild tenderness epigastrium, otherwise non-tender and non-distended, no rebound, no guarding, no rigidity.  Negative Cole sign.  No CVA tenderness..  No signs of external trauma to chest and abdomen, no ecchymosis.  No tenderness or deformity to head, maxillo-facial, or midline of cervical/thoracic/lumbar vertebrae.  (+) Tenderness to the medial and lateral malleoli, mild swelling in this region.  Mildly reduced range of motion in all planes.  Otherwise no tenderness, deformity or reduced ROM to all other joints of all four extremities.  Pelvis stable.  Neurovascularly intact in all 4 extremities with 5/5 strength, normal sensation, equal pulses and brisk capillary refill, soft compartments.  Cranial nerves III-XII intact, no pronator drift, normal speech and gait.  No skin laceration.    Initial plan to workup for left lower extremity traumatic injury, plan for x-ray.  However after initial evaluation, patient's  mention patient with epigastric pain.  When patient was questioned about this, patient states she had nausea and epigastric pain which has been intermittent, and is concerned about side effect from Zepbound including pancreatitis.  Patient states this is unrelated to her fall.  Patient had no prodrome or pain prior.  No syncope.  Thus, will obtain labs to evaluate for hematologic disorder, metabolic derangements, hepatic and renal function, and screen for pancreatitis.  Will obtain EKG and troponin to evaluate for possible cardiac abnormality including ACS and arrhythmia.  Patient also requesting parathyroid and calcium lab testing.    My independent interpretation of the left ankle x-ray images shows no acute fracture or dislocation.   More details to be seen in the official radiologist read.    My independent interpretation of the EKG shows:  Sinus rhythm with sinus arrhythmia with rate of 72 bpm, no ST elevations or depressions.  QTc 424.    Labs reviewed, nonactionable, troponin less than 6.  One-time troponin appropriate given the timing and duration of symptoms.  Urinalysis with trace leukocyte esterase but no WBCs or bacteria.    At 2145, the patient was reassessed and they state that their condition has improved. Stable vitals. Repeat neuro exam is benign without any neuro deficits. Repeat cardiovascular examination shows NRRR, equal pulses in all 4 extremities. Repeat pulmonary examination shows equal bilateral lung sounds. Repeat abdominal examination shows no tenderness, no peritoneal signs including rebound or guarding. The patient was able to eat, drink, and ambulate safely with assistance in a Aircast in the ED. I, Jean Rios MD, Emergency Medicine Attending Physician, personally saw and examined the patient and I personally made/approve the management plan and take responsibility for the patient management.    MDM: 38-year-old female with history of parathyroid mass s/p resection, ocular migraines, who was brought in by EMS for left ankle pain and swelling after a mechanical trip and fall on uneven concrete.  Patient believes she inverted her ankle.  Patient has been unable to ambulate due to pain in the left ankle.  Patient states she has pain to the medial and lateral aspects.  Separately, patient has been having epigastric pain for the last several days, intermittent, and started Zepbound few weeks ago, and patient is concerned about pancreatitis.  No exertional or pleuritic symptoms, no radiation of pain.  Patient is asymptomatic at this time.  Denies being on any antiplatelets or anticoagulants, no personal or family history of bleeding disorder.    Meds: Zepbound, Topamax, Wellbutrin, Adderall    ROS: denies LOC, syncope, head injury, neck pain, chest pain, shortness of breath, back pain, numbness, weakness, vomiting, lightheadedness, vision changes.    Vitals stable.  Mildly uncomfortable.,  NCAT, GCS 15, PERRL, EOMI without diplopia or discomfort, trachea midline, no stridor, CTAB, NRRR.  No chest wall tenderness, deformity or crepitus.  ABD: Abdomen soft, with mild tenderness epigastrium, otherwise non-tender and non-distended, no rebound, no guarding, no rigidity.  Negative Cole sign.  No CVA tenderness..  No signs of external trauma to chest and abdomen, no ecchymosis.  No tenderness or deformity to head, maxillo-facial, or midline of cervical/thoracic/lumbar vertebrae.  (+) Tenderness to the medial and lateral malleoli, mild swelling in this region.  Mildly reduced range of motion in all planes.  Otherwise no tenderness, deformity or reduced ROM to all other joints of all four extremities.  Pelvis stable.  Neurovascularly intact in all 4 extremities with 5/5 strength, normal sensation, equal pulses and brisk capillary refill, soft compartments.  Cranial nerves III-XII intact, no pronator drift, normal speech and gait.  No skin laceration.    Initial plan to workup for left lower extremity traumatic injury, plan for x-ray.  However after initial evaluation, patient's  mention patient with epigastric pain.  When patient was questioned about this, patient states she had nausea and epigastric pain which has been intermittent, and is concerned about side effect from Zepbound including pancreatitis.  Patient states this is unrelated to her fall.  Patient had no prodrome or pain prior.  No syncope.  Thus, will obtain labs to evaluate for hematologic disorder, metabolic derangements, hepatic and renal function, and screen for pancreatitis.  Will obtain EKG and troponin to evaluate for possible cardiac abnormality including ACS and arrhythmia.  Patient also requesting parathyroid and calcium lab testing.    My independent interpretation of the left ankle x-ray images shows no acute fracture or dislocation.   More details to be seen in the official radiologist read.    My independent interpretation of the EKG shows:  Sinus rhythm with sinus arrhythmia with rate of 72 bpm, no ST elevations or depressions.  QTc 424.    Labs reviewed, nonactionable, troponin less than 6.  One-time troponin appropriate given the timing and duration of symptoms.  Urinalysis with trace leukocyte esterase but no WBCs or bacteria.    At 2145, the patient was reassessed and they state that their condition has improved. Stable vitals. Repeat neuro exam is benign without any neuro deficits. Repeat cardiovascular examination shows NRRR, equal pulses in all 4 extremities. Repeat pulmonary examination shows equal bilateral lung sounds. Repeat abdominal examination shows no tenderness, no peritoneal signs including rebound or guarding. The patient was able to eat, drink, and ambulate safely with assistance in a Aircast in the ED.    Stable for discharge with close follow-up and strict return precautions. Discussed the indications and side-effects of applicable medications.  Informed patient of all diagnostic test results including labs and imaging. The patient has been informed of all concerning signs and symptoms to return to Emergency Department, the necessity to follow up with PMD 1 to 2 days and within orthopedist within 2-3 days was explained, or to return to the ED if unable to follow-up appropriately, and the patient reports understanding of above with capacity and insight.

## 2024-08-30 NOTE — ED ADULT NURSE NOTE - OBJECTIVE STATEMENT
38y F bibems presents to the ED s/p mechanical trip and fall. Pt c/o L ankle pain. Edema present on exam. Reports hx of L ankle sprain a/w difficulty bearing weight.

## 2024-08-30 NOTE — ED PROVIDER NOTE - PROGRESS NOTE DETAILS
Kane Pearson MD PGY2: Patient reassessed, stable. XR without acute fracture or dislocation of ankle. Likely sprain/ligamentous injury with soft tissue swelling. Pain controlled. Labs nonactionable for pancreatitis. Abd pain likely 2/2 adverse zepbound effect. To be discharged with air cast and outpatient ortho follow up. Lab and imaging results were discussed with the patient. Shared decision making was held between provider and patient with regards to disposition decision. All questions and concerns were addressed. Patient is agreeable to disposition plan.

## 2024-08-30 NOTE — ED PROVIDER NOTE - NSFOLLOWUPINSTRUCTIONS_ED_ALL_ED_FT
You were seen in the emergency department after mechanical fall complaining of left ankle and foot pain.  Your x-ray showed no obvious fractures or dislocations.  Your symptoms are consistent with a ankle sprain.    You are also endorsing some upper abdominal pain while taking your zepbound.  Your blood work showed no signs of pancreatitis.  Your symptoms are likely consistent with adverse effects from gastroparesis from the medication.  Follow-up with your primary care doctor or whoever prescribed a medication for further recommendations.    You may take 500-1000 mg acetaminophen every 6 hours, as needed for pain.  You may take 600 mg ibuprofen every 8 hours, with food, as needed for pain.    For symptom control, you may use the acronym RICE:   Rest the affected area  Ice the area intermittently, no longer than 10 minutes at a time. Do not fall asleep with ice on.  Apply light Compression to the affected area (can use an ACE wrap)  Elevate the affected area.     Follow up with an orthopedic surgeon within 1 week. Clinic information is attached.    [facility] at [address], [address] [place] [id number]  Phone: [phone]  Fax: [phone]    [facility] at [address], [address] [address]  Phone: [phone]    [facility] at [place] ([address])  [address] [place] [address]  Phone: [phone]  Fax: [phone]    [facility] at [place] ([address])  [address] [place] [address]  Phone: [phone]  Fax: [phone]      Follow up with your primary physician in 1-2 days. If needed call [phone] to find a primary care physician or call  [phone] to schedule an appointment with the general medicine.    1. TAKE ALL MEDICATIONS AS DIRECTED.    2. FOR PAIN OR FEVER YOU CAN TAKE IBUPROFEN (MOTRIN, ADVIL) OR ACETAMINOPHEN (TYLENOL) AS NEEDED, AS DIRECTED ON PACKAGING.  3. FOLLOW UP WITH YOUR PRIMARY DOCTOR WITHIN 5 DAYS AS DIRECTED.  4. IF YOU HAD LABS OR IMAGING DONE, YOU WERE GIVEN COPIES OF ALL LABS AND/OR IMAGING RESULTS FROM YOUR ER VISIT--PLEASE TAKE THEM WITH YOU TO YOUR FOLLOW UP APPOINTMENTS.  5. RETURN TO THE ER FOR ANY WORSENING SYMPTOMS OR CONCERNS.    ----------------------------------------------------------------------------------------------------------------   Ankle Sprain    An ankle sprain is a stretch or tear in a ligament in your ankle. Ligaments are tissues that connect bones to each other.    An ankle sprain can happen when:  The ankle rolls outward. This is called an inversion sprain.  The ankle rolls inward. This is called an eversion sprain.    What are the causes?  An ankle sprain is caused by rolling or twisting your ankle.    What increases the risk?  You are more likely to get an ankle sprain if you play sports.    What are the signs or symptoms?  Comparison of a normal ankle and an ankle that is swollen and bruised.  Pain in your ankle.  Swelling.  Bruising. Bruises may form right after you sprain your ankle or 1–2 days later.  Trouble standing or walking.    How is this treated?  An ankle sprain may be treated with:  A brace or splint. This is used to keep the ankle from moving until it heals.  An elastic bandage (dressing). This is used to support the ankle.  Crutches.  Pain medicine.  Surgery. This may be needed if the sprain is very bad.  Physical therapy. This can help you move your ankle better.  Follow these instructions at home:    If you have a brace or a splint that can be taken off:    Wear the brace or splint as told by your doctor. Take it off only as told by your doctor.  Check the skin around the brace or splint every day. Tell your doctor if you see problems.  Loosen the brace or splint if your toes:  Tingle.  Become numb.  Turn cold and blue.  Keep the brace or splint clean and dry.  If the brace or splint is not waterproof:  Do not let it get wet.  Cover it with a watertight covering when you take a bath or a shower.  If you have an elastic dressing:    Take it off to shower or bathe.  Adjust it if it feels too tight.  Loosen the dressing if your foot:  Tingles.  Becomes numb.  Turns cold and blue.  Managing pain, stiffness, and swelling    If told, put ice on the affected area.  If you have a removable brace or splint, take it off as told by your doctor.  Put ice in a plastic bag.  Place a towel between your skin and the bag.  Leave the ice on for 20 minutes, 2–3 times a day.  If your skin turns bright red, take off the ice right away to prevent skin damage. The risk of damage is higher if you cannot feel pain, heat, or cold.  Move your toes often.  Raise your ankle above the level of your heart while you are sitting or lying down.    General instructions    Take over-the-counter and prescription medicines only as told by your doctor.  Do not smoke or use any products that contain nicotine or tobacco. If you need help quitting, ask your doctor.  Rest your ankle.  Use crutches to support your body weight. Do not use your injured leg to support your body weight until your doctor says that you can.  Ask your doctor when it is safe to drive if you have a brace or splint on your ankle.    Contact a doctor if:  Your bruises or swelling get worse all of a sudden.  Your pain does not get better after you take medicine.    Get help right away if:  Your foot or toes are numb or blue.  You have very bad pain that gets worse.

## 2024-08-30 NOTE — ED PROVIDER NOTE - CLINICAL SUMMARY MEDICAL DECISION MAKING FREE TEXT BOX
38-year-old female with history of parathyroid mass s/p resection, ocular migraines, recent started on Zyban presenting to the ED after mechanical trip and fall onto concrete now endorsing left ankle and foot pain.  Was not able to ambulate at the site of injury or any emergency department.  No head strike or LOC.  No AC.  Pain is most focal in the bilateral malleoli regions and calcaneus.  No axial loadbearing injury to be concern for remote proximal lower extremity or back injuries.  No open wounds.  Of note patient also was recently started on zepbound and has had significant nausea and epigastric pain.  On exam patient is uncomfortable appearing and teary.  Hemodynamically stable.  Has epigastric tenderness to palpation without peritoneal signs.  Left lower extremity has edema circumferentially around the left ankle joint most focally around the malleolar regions with tenderness at those sites.  Otherwise neurovascularly intact.  No open fractures appreciated.  Proximal lower extremities intact with range of motion to the left knee within normal limits.  No anterior tib-fib region tenderness to palpation.  Achilles tendon appears intact.  Plan for screening labs, lipase to rule out pancreatitis in the setting of epigastric pain on zepbound.  X-rays of the foot ankle and tib-fib to assess for bony injury.  Pain management.  Dispo pending labs and imaging.

## 2024-08-30 NOTE — ED PROVIDER NOTE - ADDITIONAL NOTES AND INSTRUCTIONS:
Patient was seen in the ED on 8/30/24. Please afford all appropriate accommodations upon return to work.    Kane Pearson MD

## 2024-08-30 NOTE — ED PROVIDER NOTE - PHYSICAL EXAMINATION
GEN: Patient awake and alert. No acute distress, non-toxic. Uncomfortable appearing, teary.   Head: Normocephalic, atraumatic.  Neck: Nontender, full ROM.   Eyes: PERRLA b/l. EOMI, no scleral icterus, no conjunctival injection. Moist mucous membranes.  CARDIAC: RRR. Normal S1, S2. No murmur, rubs, or gallops. No peripheral edema noted.  PULM: Speaking in full sentences. CTA B/L no wheeze, rales or rhonchi. No signs of respiratory distress, no accessory muscle usage or nasal flaring.  ABD: Soft, epigastric TTP, nondistended. No rebound, no involuntary guarding.   MSK: Left ankle edematous with TTP over b/l malleolar prominences. No anterior shin TTP or ecchymoses. Achilles tendon appears intact. Sensation intact. Moving all other extremities spontaneously. Full ROM in all other extremities.   NEURO: A&Ox3, no focal neurological deficits, CN 2-12 grossly intact. Following simple commands.   SKIN: Warm, dry, no rash, no lesions, no open wounds. No urticaria. No jaundice.

## 2024-08-30 NOTE — ED PROVIDER NOTE - PATIENT PORTAL LINK FT
You can access the FollowMyHealth Patient Portal offered by [facility] by registering at the following website: [email or web address]. By joining [facility]’s FollowMyHealth portal, you will also be able to view your health information using other applications (apps) compatible with our system.

## 2024-08-31 LAB
CALCIUM SERPL-MCNC: 8.9 MG/DL — SIGNIFICANT CHANGE UP (ref 8.4–10.5)
PTH-INTACT FLD-MCNC: 38 PG/ML — SIGNIFICANT CHANGE UP (ref 15–65)

## 2024-09-22 ENCOUNTER — NON-APPOINTMENT (OUTPATIENT)
Age: 39
End: 2024-09-22

## 2025-01-10 ENCOUNTER — NON-APPOINTMENT (OUTPATIENT)
Age: 40
End: 2025-01-10

## 2025-01-16 ENCOUNTER — NON-APPOINTMENT (OUTPATIENT)
Age: 40
End: 2025-01-16

## 2025-01-30 ENCOUNTER — NON-APPOINTMENT (OUTPATIENT)
Age: 40
End: 2025-01-30

## 2025-02-22 NOTE — ED PROCEDURE NOTE - NS ED PROCEDURE ASSISTED BY
You were seen evaluated in the emergency department for nausea vomiting and increased urination.  You were found to have a very high blood sugar, likely have a upper respiratory tract infection and a UTI.  I am sending you home with an antibiotic please take this for 7 days.  I am also sending home with a nausea medication which she can take as needed up to 3 times a day.    Please continue to check your sugars and utilize your sliding scale insulin as prescribed.    I recommend following up with your primary care physician in 1 to 2 weeks to repeat tests and make sure that your diabetes is appropriately controlled.    Please come back to the emerged department if you start to notice any worsening lightheadedness, dizziness, urinary complaints, nausea vomiting, abdominal pain or any other complaints.  
The procedure was performed independently